# Patient Record
Sex: MALE | Race: WHITE | Employment: UNEMPLOYED | URBAN - METROPOLITAN AREA
[De-identification: names, ages, dates, MRNs, and addresses within clinical notes are randomized per-mention and may not be internally consistent; named-entity substitution may affect disease eponyms.]

---

## 2019-10-04 ENCOUNTER — HOSPITAL ENCOUNTER (INPATIENT)
Facility: CLINIC | Age: 44
LOS: 3 days | Discharge: LEFT BEFORE DISCHARGE | End: 2019-10-07
Attending: PSYCHIATRY & NEUROLOGY | Admitting: PSYCHIATRY & NEUROLOGY
Payer: MEDICAID

## 2019-10-04 DIAGNOSIS — F19.20 CHEMICAL DEPENDENCY (H): ICD-10-CM

## 2019-10-04 DIAGNOSIS — F11.93 OPIATE WITHDRAWAL (H): ICD-10-CM

## 2019-10-04 DIAGNOSIS — F11.20 UNCOMPLICATED OPIOID DEPENDENCE (H): ICD-10-CM

## 2019-10-04 PROBLEM — F19.10 POLYSUBSTANCE ABUSE (H): Status: ACTIVE | Noted: 2019-10-04

## 2019-10-04 LAB
AMPHETAMINES UR QL SCN: POSITIVE
ANION GAP SERPL CALCULATED.3IONS-SCNC: 6 MMOL/L (ref 3–14)
BARBITURATES UR QL: NEGATIVE
BASOPHILS # BLD AUTO: 0 10E9/L (ref 0–0.2)
BASOPHILS NFR BLD AUTO: 0.1 %
BENZODIAZ UR QL: POSITIVE
BUN SERPL-MCNC: 11 MG/DL (ref 7–30)
CALCIUM SERPL-MCNC: 8.2 MG/DL (ref 8.5–10.1)
CANNABINOIDS UR QL SCN: NEGATIVE
CHLORIDE SERPL-SCNC: 101 MMOL/L (ref 94–109)
CO2 SERPL-SCNC: 29 MMOL/L (ref 20–32)
COCAINE UR QL: NEGATIVE
CREAT SERPL-MCNC: 0.86 MG/DL (ref 0.66–1.25)
DIFFERENTIAL METHOD BLD: ABNORMAL
EOSINOPHIL # BLD AUTO: 0.4 10E9/L (ref 0–0.7)
EOSINOPHIL NFR BLD AUTO: 6.1 %
ERYTHROCYTE [DISTWIDTH] IN BLOOD BY AUTOMATED COUNT: 13.2 % (ref 10–15)
ETHANOL UR QL SCN: NEGATIVE
GFR SERPL CREATININE-BSD FRML MDRD: >90 ML/MIN/{1.73_M2}
GLUCOSE SERPL-MCNC: 108 MG/DL (ref 70–99)
HCT VFR BLD AUTO: 37.6 % (ref 40–53)
HGB BLD-MCNC: 12 G/DL (ref 13.3–17.7)
IMM GRANULOCYTES # BLD: 0 10E9/L (ref 0–0.4)
IMM GRANULOCYTES NFR BLD: 0.3 %
LYMPHOCYTES # BLD AUTO: 1.4 10E9/L (ref 0.8–5.3)
LYMPHOCYTES NFR BLD AUTO: 20 %
MCH RBC QN AUTO: 26.8 PG (ref 26.5–33)
MCHC RBC AUTO-ENTMCNC: 31.9 G/DL (ref 31.5–36.5)
MCV RBC AUTO: 84 FL (ref 78–100)
MONOCYTES # BLD AUTO: 0.6 10E9/L (ref 0–1.3)
MONOCYTES NFR BLD AUTO: 8.7 %
NEUTROPHILS # BLD AUTO: 4.5 10E9/L (ref 1.6–8.3)
NEUTROPHILS NFR BLD AUTO: 64.8 %
NRBC # BLD AUTO: 0 10*3/UL
NRBC BLD AUTO-RTO: 0 /100
OPIATES UR QL SCN: POSITIVE
PLATELET # BLD AUTO: 285 10E9/L (ref 150–450)
POTASSIUM SERPL-SCNC: 3.8 MMOL/L (ref 3.4–5.3)
RBC # BLD AUTO: 4.47 10E12/L (ref 4.4–5.9)
SODIUM SERPL-SCNC: 138 MMOL/L (ref 133–144)
WBC # BLD AUTO: 6.9 10E9/L (ref 4–11)

## 2019-10-04 PROCEDURE — 80048 BASIC METABOLIC PNL TOTAL CA: CPT | Performed by: PSYCHIATRY & NEUROLOGY

## 2019-10-04 PROCEDURE — 80307 DRUG TEST PRSMV CHEM ANLYZR: CPT | Performed by: FAMILY MEDICINE

## 2019-10-04 PROCEDURE — HZ2ZZZZ DETOXIFICATION SERVICES FOR SUBSTANCE ABUSE TREATMENT: ICD-10-PCS | Performed by: PSYCHIATRY & NEUROLOGY

## 2019-10-04 PROCEDURE — 12800008 ZZH R&B CD ADULT

## 2019-10-04 PROCEDURE — 99285 EMERGENCY DEPT VISIT HI MDM: CPT | Mod: Z6 | Performed by: PSYCHIATRY & NEUROLOGY

## 2019-10-04 PROCEDURE — 99285 EMERGENCY DEPT VISIT HI MDM: CPT | Performed by: PSYCHIATRY & NEUROLOGY

## 2019-10-04 PROCEDURE — 25000132 ZZH RX MED GY IP 250 OP 250 PS 637: Performed by: PSYCHIATRY & NEUROLOGY

## 2019-10-04 PROCEDURE — 85025 COMPLETE CBC W/AUTO DIFF WBC: CPT | Performed by: PSYCHIATRY & NEUROLOGY

## 2019-10-04 PROCEDURE — 80320 DRUG SCREEN QUANTALCOHOLS: CPT | Performed by: FAMILY MEDICINE

## 2019-10-04 RX ORDER — ONDANSETRON 4 MG/1
4 TABLET, ORALLY DISINTEGRATING ORAL EVERY 6 HOURS PRN
Status: DISCONTINUED | OUTPATIENT
Start: 2019-10-04 | End: 2019-10-07 | Stop reason: HOSPADM

## 2019-10-04 RX ORDER — LOPERAMIDE HCL 2 MG
2 CAPSULE ORAL 4 TIMES DAILY PRN
Status: DISCONTINUED | OUTPATIENT
Start: 2019-10-04 | End: 2019-10-07 | Stop reason: HOSPADM

## 2019-10-04 RX ORDER — BISACODYL 10 MG
10 SUPPOSITORY, RECTAL RECTAL DAILY PRN
Status: DISCONTINUED | OUTPATIENT
Start: 2019-10-04 | End: 2019-10-07 | Stop reason: HOSPADM

## 2019-10-04 RX ORDER — ACETAMINOPHEN 325 MG/1
650 TABLET ORAL EVERY 4 HOURS PRN
Status: DISCONTINUED | OUTPATIENT
Start: 2019-10-04 | End: 2019-10-07 | Stop reason: HOSPADM

## 2019-10-04 RX ORDER — TRAZODONE HYDROCHLORIDE 50 MG/1
50 TABLET, FILM COATED ORAL
Status: DISCONTINUED | OUTPATIENT
Start: 2019-10-04 | End: 2019-10-07 | Stop reason: HOSPADM

## 2019-10-04 RX ORDER — HYDROXYZINE HYDROCHLORIDE 25 MG/1
25 TABLET, FILM COATED ORAL EVERY 4 HOURS PRN
Status: DISCONTINUED | OUTPATIENT
Start: 2019-10-04 | End: 2019-10-07 | Stop reason: HOSPADM

## 2019-10-04 RX ORDER — IBUPROFEN 600 MG/1
600 TABLET, FILM COATED ORAL EVERY 6 HOURS PRN
Status: DISCONTINUED | OUTPATIENT
Start: 2019-10-04 | End: 2019-10-07 | Stop reason: HOSPADM

## 2019-10-04 RX ORDER — BUPRENORPHINE 2 MG/1
4 TABLET SUBLINGUAL 2 TIMES DAILY
Status: DISCONTINUED | OUTPATIENT
Start: 2019-10-05 | End: 2019-10-05

## 2019-10-04 RX ORDER — ALUMINA, MAGNESIA, AND SIMETHICONE 2400; 2400; 240 MG/30ML; MG/30ML; MG/30ML
30 SUSPENSION ORAL EVERY 4 HOURS PRN
Status: DISCONTINUED | OUTPATIENT
Start: 2019-10-04 | End: 2019-10-07 | Stop reason: HOSPADM

## 2019-10-04 RX ORDER — BUPRENORPHINE 2 MG/1
4 TABLET SUBLINGUAL ONCE
Status: COMPLETED | OUTPATIENT
Start: 2019-10-04 | End: 2019-10-04

## 2019-10-04 RX ADMIN — BUPRENORPHINE HCL 4 MG: 2 TABLET SUBLINGUAL at 22:54

## 2019-10-04 SDOH — HEALTH STABILITY: MENTAL HEALTH: HOW OFTEN DO YOU HAVE A DRINK CONTAINING ALCOHOL?: NEVER

## 2019-10-04 ASSESSMENT — ENCOUNTER SYMPTOMS
DIAPHORESIS: 1
RESPIRATORY NEGATIVE: 1
ACTIVITY CHANGE: 1
NEUROLOGICAL NEGATIVE: 1
CARDIOVASCULAR NEGATIVE: 1
HALLUCINATIONS: 0
HYPERACTIVE: 0
EYES NEGATIVE: 1
CHILLS: 1
PSYCHIATRIC NEGATIVE: 1
AGITATION: 0
GASTROINTESTINAL NEGATIVE: 1
MUSCULOSKELETAL NEGATIVE: 1

## 2019-10-04 ASSESSMENT — ACTIVITIES OF DAILY LIVING (ADL): HYGIENE/GROOMING: INDEPENDENT

## 2019-10-04 ASSESSMENT — MIFFLIN-ST. JEOR: SCORE: 1715.92

## 2019-10-04 NOTE — PHARMACY-ADMISSION MEDICATION HISTORY
Admission medication history for the October 4, 2019 admission is complete.     Medication history interview sources: patient    Medication compliance: N/A - patient not prescribed medications    Changes made to PTA medication list (reason)  Added: none  Deleted: none  Changed: none    Additional medication history information (including reliability of information, actions taken by pharmacist):  - Patient denies taking/being prescribed prescription medications and over-the-counter (OTC) products such as vitamins, sleep aids, etc.      Prior to Admission medications    Not on File       Time spent: 5 minutes    Medication history completed by:   Joaquim Rosas, Pharm.D.  [facility]  Emergency Department: Ascom *[phone]

## 2019-10-04 NOTE — ED PROVIDER NOTES
History     Chief Complaint   Patient presents with     Drug / Alcohol Assessment     Seeing dtox from heroin snorts 1/2gm -1 gm per day.  Last used 0200 today.  Smokes meth 1/2 gm  per day  Last used @ 05-06 this morning.     The history is provided by the patient.   Drug / Alcohol Assessment       Wagner Lujan is a 43 year old male who is here seeking detox for heroin addiction. Patient has not been in detox for opiate dependence. He has history of being in treatment for alcohol over 20 years ago. Patient reports abusing heroin past 2 years. He had tried pain pills previously but admits to abusing meth and started on heroin to prolong the effect. He snorts up to 1 gm heroin daily. He smokes meth frequently. He last used heroin at 2 am this morning and used meth at 6 am this morning. He has sweats and chills as withdrawal presently. He recently got into trouble with the law. He is tired of the addiction cycle and the expensive habit. He has tried to get into treatment and has a rule 25 in 3 days. He came here seeking detox as he is unable to stop on his own. Patient denies using other drugs. He is also positive for benzos in his urine. He is not on any meds. And reports being generally healthy. He denies history of IVDU. He denies feeling suicidal nor have hallucinations or paranoia.     PERSONAL MEDICAL HISTORY  Past Medical History:   Diagnosis Date     Substance abuse (H) 2004    [facility]     PAST SURGICAL HISTORY  History reviewed. No pertinent surgical history.  FAMILY HISTORY  History reviewed. No pertinent family history.  SOCIAL HISTORY  Social History     Tobacco Use     Smoking status: Never Smoker     Smokeless tobacco: Never Used   Substance Use Topics     Alcohol use: Never     Frequency: Never     MEDICATIONS  No current facility-administered medications for this encounter.      No current outpatient medications on file.     ALLERGIES  No Known Allergies      I have reviewed the  Medications, Allergies, Past Medical and Surgical History, and Social History in the Epic system.    Review of Systems   Constitutional: Positive for activity change, chills and diaphoresis.   HENT: Negative.    Eyes: Negative.    Respiratory: Negative.    Cardiovascular: Negative.    Gastrointestinal: Negative.    Genitourinary: Negative.    Musculoskeletal: Negative.    Skin: Negative.    Neurological: Negative.    Psychiatric/Behavioral: Negative.  Negative for agitation, behavioral problems, hallucinations and suicidal ideas. The patient is not hyperactive.    All other systems reviewed and are negative.      Physical Exam   BP: 110/71  Pulse: 78  Temp: 98  F (36.7  C)  Resp: 16  Weight: 77.1 kg (170 lb)  SpO2: 100 %      Physical Exam  Vitals signs and nursing note reviewed.   Constitutional:       Appearance: Normal appearance.   HENT:      Head: Normocephalic and atraumatic.      Nose: Nose normal.      Mouth/Throat:      Mouth: Mucous membranes are moist.   Eyes:      Pupils: Pupils are equal, round, and reactive to light.   Neck:      Musculoskeletal: Normal range of motion.   Cardiovascular:      Rate and Rhythm: Normal rate.   Pulmonary:      Effort: Pulmonary effort is normal.      Breath sounds: Normal breath sounds.   Abdominal:      General: Abdomen is flat.   Musculoskeletal: Normal range of motion.   Skin:     General: Skin is warm.   Neurological:      General: No focal deficit present.      Mental Status: He is alert and oriented to person, place, and time.   Psychiatric:         Attention and Perception: Attention and perception normal.         Mood and Affect: Mood and affect normal.         Speech: Speech normal.         Behavior: Behavior normal. Behavior is not agitated, aggressive, hyperactive or combative. Behavior is cooperative.         Thought Content: Thought content normal. Thought content is not paranoid or delusional. Thought content does not include homicidal or suicidal ideation.          Cognition and Memory: Cognition normal.         Judgement: Judgment normal.         ED Course        Procedures               Labs Ordered and Resulted from Time of ED Arrival Up to the Time of Departure from the ED   DRUG ABUSE SCREEN 6 CHEM DEP URINE ([facility]) - Abnormal; Notable for the following components:       Result Value    Amphetamine Qual Urine Positive (*)     Benzodiazepine Qual Urine Positive (*)     Opiates Qualitative Urine Positive (*)     All other components within normal limits   CBC WITH PLATELETS DIFFERENTIAL   BASIC METABOLIC PANEL            Assessments & Plan (with Medical Decision Making)   Patient with opiate dependence who is in early withdrawal. He would like help with detox. He has a bed held. He is medically and psychiatrically cleared for admission.    I have reviewed the nursing notes.    I have reviewed the findings, diagnosis, plan and need for follow up with the patient.    New Prescriptions    No medications on file       Final diagnoses:   Opiate withdrawal (H)   Chemical dependency (H)   Uncomplicated opioid dependence (H)       10/4/2019   [facility], [place], EMERGENCY DEPARTMENT     Eyad Ocasio MD  10/04/19 6094

## 2019-10-04 NOTE — ED NOTES
ED to Behavioral Floor Handoff    SITUATION  Wagner Lujan is a 43 year old male who speaks English and lives in a home with others The patient arrived in the ED by private car from home with a complaint of Drug / Alcohol Assessment (Seeing dtox from heroin snorts 1/2gm -1 gm per day.  Last used 0200 today.  Smokes meth 1/2 gm  per day  Last used @ 05-06 this morning.)  .The patient's current symptoms started/worsened 6 month(s) ago and during this time the symptoms have increased.   In the ED, pt was diagnosed with   Final diagnoses:   Opiate withdrawal (H)   Chemical dependency (H)   Uncomplicated opioid dependence (H)        Initial vitals were: BP: 110/71  Pulse: 78  Temp: 98  F (36.7  C)  Resp: 16  Weight: 77.1 kg (170 lb)  SpO2: 100 %   --------  Is the patient diabetic? No   If yes, last blood glucose? --     If yes, was this treated in the ED? --  --------  Is the patient inebriated (ETOH) No or Impaired on other substances? Yes  MSSA done? N/A  Last MSSA score: --    Were withdrawal symptoms treated? No  Does the patient have a seizure history? No. If yes, date of most recent seizure--  --------  Is the patient patient experiencing suicidal ideation? denies current or recent suicidal ideation     Homicidal ideation? denies current or recent homicidal ideation or behaviors.    Self-injurious behavior/urges? denies current or recent self injurious behavior or ideation.  ------  Was pt aggressive in the ED No  Was a code called No  Is the pt now cooperative? Yes  -------  Meds given in ED: Medications - No data to display   Family present during ED course? No  Family currently present? No    BACKGROUND  Does the patient have a cognitive impairment or developmental disability? No  Allergies: No Known Allergies.   Social demographics are   Social History     Socioeconomic History     Marital status: Single     Spouse name: None     Number of children: None     Years of education: None     Highest education  level: None   Occupational History     None   Social Needs     Financial resource strain: None     Food insecurity:     Worry: None     Inability: None     Transportation needs:     Medical: None     Non-medical: None   Tobacco Use     Smoking status: Never Smoker     Smokeless tobacco: Never Used   Substance and Sexual Activity     Alcohol use: Never     Frequency: Never     Drug use: Yes     Types: Methamphetamines, Opiates     Sexual activity: None   Lifestyle     Physical activity:     Days per week: None     Minutes per session: None     Stress: None   Relationships     Social connections:     Talks on phone: None     Gets together: None     Attends Restoration service: None     Active member of club or organization: None     Attends meetings of clubs or organizations: None     Relationship status: None     Intimate partner violence:     Fear of current or ex partner: None     Emotionally abused: None     Physically abused: None     Forced sexual activity: None   Other Topics Concern     None   Social History Narrative     None        ASSESSMENT  Labs results   Labs Ordered and Resulted from Time of ED Arrival Up to the Time of Departure from the ED   DRUG ABUSE SCREEN 6 CHEM DEP URINE ([facility]) - Abnormal; Notable for the following components:       Result Value    Amphetamine Qual Urine Positive (*)     Benzodiazepine Qual Urine Positive (*)     Opiates Qualitative Urine Positive (*)     All other components within normal limits   CBC WITH PLATELETS DIFFERENTIAL - Abnormal; Notable for the following components:    Hemoglobin 12.0 (*)     Hematocrit 37.6 (*)     All other components within normal limits   BASIC METABOLIC PANEL - Abnormal; Notable for the following components:    Glucose 108 (*)     Calcium 8.2 (*)     All other components within normal limits      Imaging Studies: No results found for this or any previous visit (from the past 24 hour(s)).   Most recent vital signs /71   Pulse 78   Temp 98   F (36.7  C) (Oral)   Resp 16   Wt 77.1 kg (170 lb)   SpO2 100%    Abnormal labs/tests/findings requiring intervention:---   Pain control: pt had none  Nausea control: pt had none    RECOMMENDATION  Are any infection precautions needed (MRSA, VRE, etc.)? No If yes, what infection? --  ---  Does the patient have mobility issues? no If yes, what device does the pt use? ---  ---  Is patient on 72 hour hold or commitment? no If on 72 hour hold, have hold and rights been given to patient? N/A  Are admitting orders written if after 10 p.m. ? no  Tasks needing to be completed:---     Fran Ford RN   [facility]-- *[phone] [facility]

## 2019-10-05 LAB
CHOLEST SERPL-MCNC: 163 MG/DL
HDLC SERPL-MCNC: 88 MG/DL
LDLC SERPL CALC-MCNC: 70 MG/DL
NONHDLC SERPL-MCNC: 75 MG/DL
T4 FREE SERPL-MCNC: 1.01 NG/DL (ref 0.76–1.46)
TRIGL SERPL-MCNC: 23 MG/DL
TSH SERPL DL<=0.005 MIU/L-ACNC: 0.22 MU/L (ref 0.4–4)

## 2019-10-05 PROCEDURE — 25000131 ZZH RX MED GY IP 250 OP 636 PS 637: Performed by: PSYCHIATRY & NEUROLOGY

## 2019-10-05 PROCEDURE — 25000132 ZZH RX MED GY IP 250 OP 250 PS 637: Performed by: PSYCHIATRY & NEUROLOGY

## 2019-10-05 PROCEDURE — H2035 A/D TX PROGRAM, PER HOUR: HCPCS | Mod: HQ

## 2019-10-05 PROCEDURE — 84443 ASSAY THYROID STIM HORMONE: CPT | Performed by: PSYCHIATRY & NEUROLOGY

## 2019-10-05 PROCEDURE — 99207 ZZC DOWN CODE DUE TO SUBSEQUENT EXAM: CPT | Performed by: PSYCHIATRY & NEUROLOGY

## 2019-10-05 PROCEDURE — 80061 LIPID PANEL: CPT | Performed by: PSYCHIATRY & NEUROLOGY

## 2019-10-05 PROCEDURE — 84439 ASSAY OF FREE THYROXINE: CPT | Performed by: PSYCHIATRY & NEUROLOGY

## 2019-10-05 PROCEDURE — 99207 ZZC NON-BILLABLE SERV PER CHARTING: CPT | Performed by: PHYSICIAN ASSISTANT

## 2019-10-05 PROCEDURE — 99207 ZZC CDG-HISTORY COMP: MEETS EXP. PROBLEM FOCUSED-DOWN CODED LACK OF ROS: CPT | Performed by: PSYCHIATRY & NEUROLOGY

## 2019-10-05 PROCEDURE — 36415 COLL VENOUS BLD VENIPUNCTURE: CPT | Performed by: PSYCHIATRY & NEUROLOGY

## 2019-10-05 PROCEDURE — 99221 1ST HOSP IP/OBS SF/LOW 40: CPT | Mod: AI | Performed by: PSYCHIATRY & NEUROLOGY

## 2019-10-05 PROCEDURE — 12800008 ZZH R&B CD ADULT

## 2019-10-05 RX ORDER — BUPRENORPHINE 2 MG/1
4 TABLET SUBLINGUAL ONCE
Status: COMPLETED | OUTPATIENT
Start: 2019-10-05 | End: 2019-10-05

## 2019-10-05 RX ORDER — BUPRENORPHINE AND NALOXONE 8; 2 MG/1; MG/1
1 FILM, SOLUBLE BUCCAL; SUBLINGUAL DAILY
Status: DISCONTINUED | OUTPATIENT
Start: 2019-10-05 | End: 2019-10-07 | Stop reason: HOSPADM

## 2019-10-05 RX ADMIN — ACETAMINOPHEN 650 MG: 325 TABLET, FILM COATED ORAL at 20:33

## 2019-10-05 RX ADMIN — HYDROXYZINE HYDROCHLORIDE 25 MG: 25 TABLET, FILM COATED ORAL at 20:33

## 2019-10-05 RX ADMIN — IBUPROFEN 600 MG: 600 TABLET ORAL at 09:38

## 2019-10-05 RX ADMIN — HYDROXYZINE HYDROCHLORIDE 25 MG: 25 TABLET, FILM COATED ORAL at 16:22

## 2019-10-05 RX ADMIN — IBUPROFEN 600 MG: 600 TABLET ORAL at 16:22

## 2019-10-05 RX ADMIN — BUPRENORPHINE AND NALOXONE 1 FILM: 8; 2 FILM, SOLUBLE BUCCAL; SUBLINGUAL at 11:03

## 2019-10-05 RX ADMIN — ONDANSETRON 4 MG: 4 TABLET, ORALLY DISINTEGRATING ORAL at 16:22

## 2019-10-05 RX ADMIN — BUPRENORPHINE HCL 4 MG: 2 TABLET SUBLINGUAL at 09:38

## 2019-10-05 RX ADMIN — ONDANSETRON 4 MG: 4 TABLET, ORALLY DISINTEGRATING ORAL at 09:38

## 2019-10-05 RX ADMIN — TRAZODONE HYDROCHLORIDE 50 MG: 50 TABLET ORAL at 21:21

## 2019-10-05 ASSESSMENT — ACTIVITIES OF DAILY LIVING (ADL)
HYGIENE/GROOMING: INDEPENDENT
BATHING: 0-->INDEPENDENT
COGNITION: 0 - NO COGNITION ISSUES REPORTED
RETIRED_COMMUNICATION: 0-->UNDERSTANDS/COMMUNICATES WITHOUT DIFFICULTY
PRIOR_FUNCTIONAL_LEVEL_COMMENT: IND
AMBULATION: 0-->INDEPENDENT
FALL_HISTORY_WITHIN_LAST_SIX_MONTHS: NO
HYGIENE/GROOMING: INDEPENDENT
ORAL_HYGIENE: INDEPENDENT
SWALLOWING: 0-->SWALLOWS FOODS/LIQUIDS WITHOUT DIFFICULTY
DRESS: 0-->INDEPENDENT
DRESS: INDEPENDENT
TRANSFERRING: 0-->INDEPENDENT
TOILETING: 0-->INDEPENDENT
RETIRED_EATING: 0-->INDEPENDENT

## 2019-10-05 NOTE — PLAN OF CARE
Problem: Substance Withdrawal  Goal: Substance Withdrawal  Description  Signs and symptoms of listed problems will be absent or manageable.  Outcome: Improving     Pt admitted for opiate withdrawal, also using Meth.  COW's 14, given zofran, Ibuprofen and subutex 4mg, then suboxone 8mg/2mg for a total of 12 mg on this shift.  He reports sx improved, he appears more comfortable this afternoon.  He denies depression, endorses situational anxiety, poor sleep, had nausea but resolved with Zofran.  Appetite is poor, but he is drinking fluids well and reports no current issues or concerns. He was instructed to complete his CD assessment paperwork. Will continue to monitor and assist with discharge planning.

## 2019-10-05 NOTE — PROGRESS NOTES
10/05/19 7981   Group Therapy Session   Group Attendance attended group session   Total Time (minutes) 45   Group Type psychotherapeutic   Group Topic Covered other (see comments)   Patient Participation/Contribution cooperative with task;discussed personal experience with topic   Psychotherapy group goals: Identify and share responses to 4 questions (fears, loves/likes, things to let go, wants).    Sammy presented as lethargic and quiet, affect flat. He participated in the activity and processed his responses with the group.

## 2019-10-05 NOTE — H&P
IDENTIFYING INFORMATION:  The patient is a 43-year-old single  male.  He works for [employer].      CHIEF COMPLAINT:  Heroin.      HISTORY OF PRESENT ILLNESS:  The patient had a previous history of alcoholism, but 4 years ago started experimenting with pain pills and 2 years ago switched from pain pills to heroin.  He has tolerance, withdrawal, progressive use with loss of control, used despite negative consequences, job, money.  He snorts it, does not use it IV.  He is sober from alcohol, but previously had alcohol issues and was in treatment.  He was using meth, started at 21, he smokes it.  He has  tolerance, withdrawal, progressive use with loss of control.  His U-tox was positive for benzos, but denies using it.  He denies any mental health issues.  Denies any suicidal or homicidal ideation, plan or intent.  Denies any auditory or visual hallucinations.      PAST PSYCHIATRIC HISTORY:  He was never psychiatrically hospitalized, but he was in treatment at [facility].      REVIEW OF SYSTEMS:  A 10-point review systems was reviewed and is significant for having opiate withdrawal symptoms.  The patient is having a runny nose, chills, body aches.      VITAL SIGNS:  Temperature of 98, pulse of 83, respiratory rate of 16, blood pressure 128/78.      FAMILY HISTORY:  Maternal grandmother and mother have alcoholism.  No mental health issues.      SOCIAL HISTORY:  Born in [place] and grew up in [place], no abuse, 12 plus 4 years of education.      MENTAL STATUS EXAMINATION:  The patient is a 43-year-old  male who appears his stated age.  He has adequate grooming, adequate hygiene, maintains good eye contact, cooperative, no psychomotor abnormalities.  Normal gait.  Mood is anxious.  Affect is congruent.  Speech is spontaneous, normal rate.  Linear thought process with no loose associations.  Does not have any active suicidal or homicidal ideation.  Does not have auditory hallucinations.   Judgment and insight are fair.  Alert, oriented x3.  Recent memory, language are all adequate.        DIAGNOSES:    1.  Opiate use disorder, severe.   2.  Opiate withdrawal.      PLAN:  The patient wants to be on Suboxone maintenance.  The patient wants inpatient treatment.  The patient's U-tox was positive for benzos, but he denies using any benzos.  He will be having symptomatic detox from methamphetamine.  The patient will be followed by Dr. Hammond who resumes care on Monday.         ASH DELEON MD             D: 10/05/2019   T: 10/05/2019   MT: DANNY      Name:     NATALIE WILSON   MRN:      [id number]        Account:      [id number]   :      1975        Admitted:     10/04/2019                   Document: [id number]       cc: Cleve Barragan MD

## 2019-10-05 NOTE — CONSULTS
"  [facility]  Internal Medicine Consult     Wagner Lujan MRN# [id number]   Age: 43 year old YOB: 1975     Date of Admission: 10/4/2019  Date of Consult:  10/5/2019    Primary Care Provider: Cleve Barragan    Requesting Service: Psychiatry  Reason for Consult: General Medical Evaluation         Assessment and Recommendations:   Wagner Lujan is a 43 year old male with a hx of polysubstance abuse who was admitted to [facility] seeking detox from heroin.     # Polysubstance abuse (methamphetamine, heroin, alcohol), acute opiate withdrawal. Management per primary team, psychiatry. Lab workup unremarkable. VSS.   - Agree with subutex        Internal Medicine will sign off at this time. Please notify if any intercurrent medical questions or concerns arise. Thank you for involving me in this patients care.       Smitha Ireland PA-C  Hospitalist Service  [phone]         Chief Complaint:   \"Shakes\"         History of Present Illness:   Wagner Lujan is a 43 year old male with a hx of polysubstance abuse who was admitted to [facility] seeking detox from heroin.   Per ED note:  \"Patient has not been in detox for opiate dependence. He has history of being in treatment for alcohol over 20 years ago. Patient reports abusing heroin past 2 years. He had tried pain pills previously but admits to abusing meth and started on heroin to prolong the effect. He snorts up to 1 gm heroin daily. He smokes meth frequently. He last used heroin at 2 am this morning and used meth at 6 am this morning. He has sweats and chills as withdrawal presently. He recently got into trouble with the law. He is tired of the addiction cycle and the expensive habit. He has tried to get into treatment and has a rule 25 in 3 days. He came here seeking detox as he is unable to stop on his own. Patient denies using other drugs. He is also positive for benzos in his urine. He is not on any meds. And " "reports being generally healthy. He denies history of IVDU. He denies feeling suicidal nor have hallucinations or paranoia. \"  Currently he complains of shakes and body aches. States that he experienced some intermittent chest pain and shortness of breath yesterday, but this resolved without intervention and he is currently asymptomatic. No fevers, chills or abdominal pain. Denies IVDU.          Review of Systems:   A 10 point review of systems was performed and is negative unless otherwise noted in HPI.          Past Medical History:     Past Medical History:   Diagnosis Date     Substance abuse (H) 2004    [facility]            Past Surgical History:    History reviewed. No pertinent surgical history.          Family History:   History reviewed. No pertinent family history.          Social History:     Social History     Tobacco Use     Smoking status: Never Smoker     Smokeless tobacco: Never Used   Substance Use Topics     Alcohol use: Never     Frequency: Never             Medications:     Current Facility-Administered Medications   Medication     acetaminophen (TYLENOL) tablet 650 mg     alum & mag hydroxide-simethicone (MYLANTA ES/MAALOX  ES) suspension 30 mL     bisacodyl (DULCOLAX) Suppository 10 mg     buprenorphine (SUBUTEX) sublingual tablet 4 mg     hydrOXYzine (ATARAX) tablet 25 mg     ibuprofen (ADVIL/MOTRIN) tablet 600 mg     loperamide (IMODIUM) capsule 2 mg     magnesium hydroxide (MILK OF MAGNESIA) suspension 30 mL     ondansetron (ZOFRAN-ODT) ODT tab 4 mg     traZODone (DESYREL) tablet 50 mg            Allergies:   No Known Allergies          Physical Exam:   /78 (BP Location: Left arm)   Pulse 83   Temp 98  F (36.7  C) (Oral)   Resp 16   Ht 1.803 m (5' 11\")   Wt 79.4 kg (175 lb)   SpO2 95%   BMI 24.41 kg/m     GENERAL: Alert and oriented x 3. NAD.   HEENT: Anicteric sclera. Mucous membranes moist.   CV: RRR. S1, S2. No murmurs appreciated.   RESPIRATORY: Effort normal " on room air. Coarse lung sounds in bilateral bases with faint expiratory wheeze.   GI: Abdomen soft and non distended with normoactive bowel sounds present in all quadrants. No tenderness, rebound, guarding.   MUSCULOSKELETAL: No joint swelling or tenderness. Moves all extremities.   NEUROLOGICAL: No focal deficits. Strength 5/5 bilaterally in upper and lower extremities.   EXTREMITIES: No peripheral edema. Intact bilateral pedal pulses.   SKIN: No jaundice. No rashes.          Labs:   CBC:  Recent Labs   Lab Test 10/04/19  1617   WBC 6.9   RBC 4.47   HGB 12.0*   HCT 37.6*   MCV 84   MCH 26.8   MCHC 31.9   RDW 13.2          CMP:  Recent Labs   Lab Test 10/04/19  1617      POTASSIUM 3.8   CHLORIDE 101   TORO 8.2*   CO2 29   BUN 11   CR 0.86   *             Smitha Ireland PA-C  Internal Medicine [facility] Hospitalist   [facility]   Pager: [phone]

## 2019-10-05 NOTE — PROGRESS NOTES
This is a 44 yo male admitted voluntary to 3[facility] due to polysubstance abuse  The pt uses meth 1/2 gram every day x 20 years.  Smokes.   GABRIELE was last noc at 2300  He uses heroin 12 to 1 gram every day x 2 years  Snorts or smokes.  GABRIELE was last noc at 2300  The pt denies alcohol use or other drug use.  Denies IV use  The pt has never been in detox has been to one treatment 15 years ago for alcohol and that was as [facility]  He has been free of alcohol since  The pts stressors are financial in nature and he has legal problems  Currently he is unemployed and has a 13 yo daughter who is with her mother  Cooperative with the interview

## 2019-10-05 NOTE — PROGRESS NOTES
Writer met with patient today. He indicated that he is going through a very bad withdrawal from his past heroin use. Patient states that he needs to have a 3 days detox in order to get to treatment and has chemical evaluation set up with a counselor in [facility] early next week. Patient verbalizes desire to go to treatment and reports that he has a place to go to after his detox. Patient expresses gratitude to be here and get the help that needs in order to get better.

## 2019-10-05 NOTE — PROGRESS NOTES
10/04/19 2101   Patient Belongings   Did you bring any home meds/supplements to the hospital?  No   Patient Belongings other (see comments)  (storage bin, discharge bin)   Patient Belongings Put in Hospital Secure Location (Security or Locker, etc.) other (see comments)   Belongings Search Yes   Clothing Search Yes   Second Staff ANGE Landon   storage bin: jacket, boots, belt, cap  Discharge bin: 2 cell phones  A               Admission:  I am responsible for any personal items that are not sent to the safe or pharmacy.  [facility] is not responsible for loss, theft or damage of any property in my possession.    Signature:  _________________________________ Date: _______  Time: _____                                              Staff Signature:  ____________________________ Date: ________  Time: _____      2nd Staff person, if patient is unable/unwilling to sign:    Signature: ________________________________ Date: ________  Time: _____     Discharge:  [place] has returned all of my personal belongings:    Signature: _________________________________ Date: ________  Time: _____                                          Staff Signature:  ____________________________ Date: ________  Time: _____

## 2019-10-06 PROCEDURE — 12800008 ZZH R&B CD ADULT

## 2019-10-06 PROCEDURE — 25000132 ZZH RX MED GY IP 250 OP 250 PS 637: Performed by: PSYCHIATRY & NEUROLOGY

## 2019-10-06 RX ORDER — QUETIAPINE FUMARATE 25 MG/1
25-50 TABLET, FILM COATED ORAL
Status: DISCONTINUED | OUTPATIENT
Start: 2019-10-06 | End: 2019-10-07 | Stop reason: HOSPADM

## 2019-10-06 RX ADMIN — QUETIAPINE FUMARATE 50 MG: 25 TABLET ORAL at 20:59

## 2019-10-06 RX ADMIN — IBUPROFEN 600 MG: 600 TABLET ORAL at 08:41

## 2019-10-06 RX ADMIN — HYDROXYZINE HYDROCHLORIDE 25 MG: 25 TABLET, FILM COATED ORAL at 11:08

## 2019-10-06 RX ADMIN — HYDROXYZINE HYDROCHLORIDE 25 MG: 25 TABLET, FILM COATED ORAL at 02:42

## 2019-10-06 RX ADMIN — HYDROXYZINE HYDROCHLORIDE 25 MG: 25 TABLET, FILM COATED ORAL at 16:29

## 2019-10-06 RX ADMIN — ACETAMINOPHEN 650 MG: 325 TABLET, FILM COATED ORAL at 11:08

## 2019-10-06 RX ADMIN — TRAZODONE HYDROCHLORIDE 50 MG: 50 TABLET ORAL at 02:42

## 2019-10-06 RX ADMIN — BUPRENORPHINE AND NALOXONE 1 FILM: 8; 2 FILM, SOLUBLE BUCCAL; SUBLINGUAL at 08:41

## 2019-10-06 RX ADMIN — HYDROXYZINE HYDROCHLORIDE 25 MG: 25 TABLET, FILM COATED ORAL at 20:11

## 2019-10-06 ASSESSMENT — ACTIVITIES OF DAILY LIVING (ADL)
HYGIENE/GROOMING: INDEPENDENT;HANDWASHING
ORAL_HYGIENE: INDEPENDENT
DRESS: SCRUBS (BEHAVIORAL HEALTH)
HYGIENE/GROOMING: INDEPENDENT

## 2019-10-06 NOTE — PLAN OF CARE
Problem: Substance Withdrawal  Goal: Substance Withdrawal  Description  Signs and symptoms of listed problems will be absent or manageable.  Outcome: Improving     Pt admitted for opiate withdrawal, also using Meth.  COW's 6, given Ibuprofen for leg cramping, he is on suboxone 8mg/2mg with plan to resume suboxone maintnenance  at [facility] with a Dr. Elizondo.  He reports that he has the phone number and will call tomorrow to schedule an appointment ASAP.  He reports sx improved, but very restless sleep last night despite Trazodone 50mg X 2 and Vistaril.  He denies depression, reports ongoing situational anxiety, appetite is good and denies any other needs or concerns.  He would like a Rule 25 done and submitted to [facility] and was encouraged to complete CD assessment paperwork and turn it in ASAP.   Will continue to monitor and assist with discharge planning. Will discuss sleep with Dr. Ocampo.

## 2019-10-07 VITALS
DIASTOLIC BLOOD PRESSURE: 64 MMHG | TEMPERATURE: 98.8 F | HEIGHT: 71 IN | WEIGHT: 175 LBS | BODY MASS INDEX: 24.5 KG/M2 | RESPIRATION RATE: 16 BRPM | OXYGEN SATURATION: 100 % | SYSTOLIC BLOOD PRESSURE: 92 MMHG | HEART RATE: 76 BPM

## 2019-10-07 LAB
ALBUMIN UR-MCNC: NEGATIVE MG/DL
APPEARANCE UR: CLEAR
BILIRUB UR QL STRIP: NEGATIVE
COLOR UR AUTO: NORMAL
GLUCOSE UR STRIP-MCNC: NEGATIVE MG/DL
HGB UR QL STRIP: NEGATIVE
KETONES UR STRIP-MCNC: NEGATIVE MG/DL
LEUKOCYTE ESTERASE UR QL STRIP: NEGATIVE
NITRATE UR QL: NEGATIVE
PH UR STRIP: 7 PH (ref 5–7)
SOURCE: NORMAL
SP GR UR STRIP: 1 (ref 1–1.03)
UROBILINOGEN UR STRIP-MCNC: NORMAL MG/DL (ref 0–2)

## 2019-10-07 PROCEDURE — 81003 URINALYSIS AUTO W/O SCOPE: CPT | Performed by: PSYCHIATRY & NEUROLOGY

## 2019-10-07 PROCEDURE — H0001 ALCOHOL AND/OR DRUG ASSESS: HCPCS

## 2019-10-07 PROCEDURE — 99232 SBSQ HOSP IP/OBS MODERATE 35: CPT | Performed by: PSYCHIATRY & NEUROLOGY

## 2019-10-07 PROCEDURE — 25000132 ZZH RX MED GY IP 250 OP 250 PS 637: Performed by: PSYCHIATRY & NEUROLOGY

## 2019-10-07 RX ADMIN — BUPRENORPHINE AND NALOXONE 1 FILM: 8; 2 FILM, SOLUBLE BUCCAL; SUBLINGUAL at 08:21

## 2019-10-07 RX ADMIN — HYDROXYZINE HYDROCHLORIDE 25 MG: 25 TABLET, FILM COATED ORAL at 08:34

## 2019-10-07 RX ADMIN — IBUPROFEN 600 MG: 600 TABLET ORAL at 08:34

## 2019-10-07 ASSESSMENT — ACTIVITIES OF DAILY LIVING (ADL)
DRESS: SCRUBS (BEHAVIORAL HEALTH)
ORAL_HYGIENE: INDEPENDENT
HYGIENE/GROOMING: HANDWASHING;INDEPENDENT

## 2019-10-07 NOTE — PROGRESS NOTES
[facility], [place]   Psychiatric Progress Note        Interim History:   The patient's care was discussed with the treatment team during the daily team meeting and/or staff's chart notes were reviewed.  Staff report patient reported having anxi, but denied dep, SI and XAVIER. No overt psychosis, chyna or confusion. Eating and sleeping well. Interested in referral to residential CD treatment and completed the self assessment portion for CD eval.  No overt psychosis, chyna or confusion.     The patient noted that he is feeling better. Reported mild lightheadedness but declined lower or adjust medications. Requested increasing Suboxone but receptive when declined. Withdrawal subsided, no current cravings. No dep, anx or SI. Sleeping and eating well. No hallucinations or racing thoughts. Interested in CD treatment and referral to outpatient Suboxone provider.     Discussed medications and care plan.        Medications:       buprenorphine HCl-naloxone HCl  1 Film Sublingual Daily          Allergies:   No Known Allergies       Labs:   No results found for this or any previous visit (from the past 24 hour(s)).       Psychiatric Examination:     Vitals:    10/06/19 1335 10/06/19 1606 10/06/19 2008 10/07/19 0746   BP: 92/64 95/70 104/68 98/67   BP Location:  Left arm Right arm    Pulse: 78 70 65 75   Resp:  16 16 16   Temp:  97.3  F (36.3  C) 97  F (36.1  C) 98.9  F (37.2  C)   TempSrc:  Tympanic Oral Oral   SpO2:    100%   Weight:       Height:                                                      Weight is 175 lbs 0 oz  Body mass index is 24.41 kg/m .    Appearance: awake, alert, adequately groomed, appeared as age stated and no apparent distress  Attitude:  cooperative  Eye Contact:  good  Mood:  better  Affect:  appropriate and in normal range, full range and reactive  Speech:  clear, coherent and normal prosody  Psychomotor Behavior:  no evidence of tardive dyskinesia, dystonia, or tics and  intact station, gait and muscle tone  Throught Process:  linear and goal oriented  Associations:  no loose associations  Thought Content:  no evidence of suicidal ideation or homicidal ideation and no evidence of psychotic thought  Insight:  fair  Judgement:  intact  Oriented to:  time, person, and place  Attention Span and Concentration:  intact  Recent and Remote Memory:  intact         Precautions:     Behavioral Orders   Procedures     Code 1 - Restrict to Unit     Routine Programming     As clinically indicated     Status 15     Every 15 minutes.     Withdrawal precautions          Diagnoses:     1.  Opiate use disorder, severe.   2.  Opiate withdrawal.          Plan:     Medications:  -- the patient was placed on Opiate withdrawal protocol and subsequently transtion to Suboxone 8-2 mg daily. The patient is interested in opiate maintenance and seeks referral to an outpatient provider.      Legal Status and Disposition:  -- volunt.   -- discharge once completed detox and establish safety in the community. CD assessment is pending. He is interested in referral to residential CD treatment and maintenance program.

## 2019-10-07 NOTE — PROVIDER NOTIFICATION
Pt requesting to discharge despite not having a set discharge plan and risk of relapse.  Notified Dr. Hammond and patient will be discharged AMA.

## 2019-10-07 NOTE — PROGRESS NOTES
Patient discharged AMA to community.  Walked off unit by Sharri CASSIDY.      All patient belongings from room and locked bin were sent with patient. No medications sent with patient and pt had nothing in security. RN reviewed risk of opioid overdose and deaths even after a  short period of no drug use a person's intolerance level is lowered. He was informed that it not safe to think a person can use the same amount of drugs as they did prior to their period of no drug use. Returning to your drug using environment and contact with your drug using friends puts you at high risk for relapse.    Patient verbalizes and demonstrates understanding of all teachings. Patient denies thoughts of harm towards self or others. Pt denies any current medical issues at this time. Patient denies any further questions and has be discharge AMA. He was encouraged to follow up with [facility] for treatment funding and his suboxone maintenance provider.

## 2019-10-07 NOTE — DISCHARGE SUMMARY
Psychiatric Discharge Summary    Wagner Lujan MRN# [id number]   Age: 43 year old YOB: 1975     Date of Admission:  10/4/2019  Date of Discharge:  10/7/2019  Admitting Physician:  Irma Hammond MD  Discharge Physician:  Irma Hammond MD         Event Leading to Hospitalization:     The patient had a previous history of alcoholism, but 4 years ago started experimenting with pain pills and 2 years ago switched from pain pills to heroin.  He has tolerance, withdrawal, progressive use with loss of control, used despite negative consequences, job, money.  He snorts it, does not use it IV.  He is sober from alcohol, but previously had alcohol issues and was in treatment.  He was using meth, started at 21, he smokes it.  He has  tolerance, withdrawal, progressive use with loss of control.  His U-tox was positive for benzos, but denies using it.  He denies any mental health issues.  Denies any suicidal or homicidal ideation, plan or intent.  Denies any auditory or visual hallucinations. Born in [place] and grew up in [place], no abuse, 12 plus 4 years of education.  Maternal grandmother and mother have alcoholism.  No mental health issues.  He was never psychiatrically hospitalized, but he was in treatment at [facility].      See Admission note by Mercedes Ocampo MD on 10/5/2019 for additional details.          Diagnoses:     Opiate use disorder, severe.  Cluster B traits suspected.          Labs:     Recent Results (from the past 168 hour(s))   Drug abuse screen 6 urine (tox)    Collection Time: 10/04/19  3:09 PM   Result Value Ref Range    Amphetamine Qual Urine Positive (A) NEG^Negative    Barbiturates Qual Urine Negative NEG^Negative    Benzodiazepine Qual Urine Positive (A) NEG^Negative    Cannabinoids Qual Urine Negative NEG^Negative    Cocaine Qual Urine Negative NEG^Negative    Ethanol Qual Urine Negative NEG^Negative    Opiates Qualitative Urine Positive (A) NEG^Negative   CBC  with platelets differential    Collection Time: 10/04/19  4:17 PM   Result Value Ref Range    WBC 6.9 4.0 - 11.0 10e9/L    RBC Count 4.47 4.4 - 5.9 10e12/L    Hemoglobin 12.0 (L) 13.3 - 17.7 g/dL    Hematocrit 37.6 (L) 40.0 - 53.0 %    MCV 84 78 - 100 fl    MCH 26.8 26.5 - 33.0 pg    MCHC 31.9 31.5 - 36.5 g/dL    RDW 13.2 10.0 - 15.0 %    Platelet Count 285 150 - 450 10e9/L    Diff Method Automated Method     % Neutrophils 64.8 %    % Lymphocytes 20.0 %    % Monocytes 8.7 %    % Eosinophils 6.1 %    % Basophils 0.1 %    % Immature Granulocytes 0.3 %    Nucleated RBCs 0 0 /100    Absolute Neutrophil 4.5 1.6 - 8.3 10e9/L    Absolute Lymphocytes 1.4 0.8 - 5.3 10e9/L    Absolute Monocytes 0.6 0.0 - 1.3 10e9/L    Absolute Eosinophils 0.4 0.0 - 0.7 10e9/L    Absolute Basophils 0.0 0.0 - 0.2 10e9/L    Abs Immature Granulocytes 0.0 0 - 0.4 10e9/L    Absolute Nucleated RBC 0.0    Basic metabolic panel    Collection Time: 10/04/19  4:17 PM   Result Value Ref Range    Sodium 138 133 - 144 mmol/L    Potassium 3.8 3.4 - 5.3 mmol/L    Chloride 101 94 - 109 mmol/L    Carbon Dioxide 29 20 - 32 mmol/L    Anion Gap 6 3 - 14 mmol/L    Glucose 108 (H) 70 - 99 mg/dL    Urea Nitrogen 11 7 - 30 mg/dL    Creatinine 0.86 0.66 - 1.25 mg/dL    GFR Estimate >90 >60 mL/min/[1.73_m2]    GFR Estimate If Black >90 >60 mL/min/[1.73_m2]    Calcium 8.2 (L) 8.5 - 10.1 mg/dL   TSH with free T4 reflex and/or T3 as indicated    Collection Time: 10/05/19  6:20 AM   Result Value Ref Range    TSH 0.22 (L) 0.40 - 4.00 mU/L   Lipid panel    Collection Time: 10/05/19  6:20 AM   Result Value Ref Range    Cholesterol 163 <200 mg/dL    Triglycerides 23 <150 mg/dL    HDL Cholesterol 88 >39 mg/dL    LDL Cholesterol Calculated 70 <100 mg/dL    Non HDL Cholesterol 75 <130 mg/dL   T4 free    Collection Time: 10/05/19  6:20 AM   Result Value Ref Range    T4 Free 1.01 0.76 - 1.46 ng/dL   UA reflex to Microscopic and Culture    Collection Time: 10/07/19  9:55 AM   Result  Value Ref Range    Color Urine Straw     Appearance Urine Clear     Glucose Urine Negative NEG^Negative mg/dL    Bilirubin Urine Negative NEG^Negative    Ketones Urine Negative NEG^Negative mg/dL    Specific Gravity Urine 1.003 1.003 - 1.035    Blood Urine Negative NEG^Negative    pH Urine 7.0 5.0 - 7.0 pH    Protein Albumin Urine Negative NEG^Negative mg/dL    Urobilinogen mg/dL Normal 0.0 - 2.0 mg/dL    Nitrite Urine Negative NEG^Negative    Leukocyte Esterase Urine Negative NEG^Negative    Source Unspecified Urine             Consults:   Wagner Lujan is a 43 year old male with a hx of polysubstance abuse who was admitted to [facility] station 3A seeking detox from heroin.      # Polysubstance abuse (methamphetamine, heroin, alcohol), acute opiate withdrawal. Management per primary team, psychiatry. Lab workup unremarkable. VSS.   - Agree with subutex     Internal Medicine will sign off at this time. Please notify if any intercurrent medical questions or concerns arise. Thank you for involving me in this patients care.     Smitha Ireland PA-C  Hospitalist Service         Hospital Course:   Wagner Lujan was admitted to detox unit  as a voluntary patient. On admission, he was seen by Mercedes Ocampo MD, who formulated the care plan. The patient was placed under status 15 (15 minute checks) to ensure patient safety. the patient was placed on Opiate withdrawal protocol and subsequently transtion to Suboxone 8-2 mg daily. He exhibited medications seeking behavior. He was not fully cooperative but was referred to CD treatment. He later demanded discharge. AMA discharge was granted. The patient noted that he is feeling better. Reported mild lightheadedness but declined lower or adjust medications. Requested increasing Suboxone but receptive when declined. Withdrawal subsided, no current cravings. No dep, anx or SI. Sleeping and eating well. No hallucinations or racing thoughts    Wagner Lujan was released to home.  At the time of this encounter, Wagner Lujan was determined to not be a danger to himself or others and symptoms did not meet criteria for involuntary hospitalization.      Safety plan, post discharge recommendations and relapse prevention were discussed with the patient. The patient agreed to call 911 or present to ED if symptoms worsen or developed thoughts of suicide, self harm or homicide.  The patient agreed to continue medications and outpatient care.         Discharge Medications:   There are no discharge medications for this patient.       Psychiatric and Physical Examinations:     Appearance: awake, alert, adequately groomed, appeared as age stated and no apparent distress  Attitude:  cooperative  Eye Contact:  good  Mood:  better  Affect:  appropriate and in normal range, full range and reactive  Speech:  clear, coherent and normal prosody  Psychomotor Behavior:  no evidence of tardive dyskinesia, dystonia, or tics and intact station, gait and muscle tone  Throught Process:  linear and goal oriented  Associations:  no loose associations  Thought Content:  no evidence of suicidal ideation or homicidal ideation and no evidence of psychotic thought  Insight:  fair  Judgement:  intact  Oriented to:  time, person, and place  Attention Span and Concentration:  intact  Recent and Remote Memory:  intact     Vitals:    10/06/19 2008 10/07/19 0746 10/07/19 1141 10/07/19 1243   BP: 104/68 98/67  92/64   BP Location: Right arm      Pulse: 65 75  76   Resp: 16 16 16    Temp: 97  F (36.1  C) 98.9  F (37.2  C) 98.8  F (37.1  C)    TempSrc: Oral Oral Oral    SpO2:  100%     Weight:       Height:       Weight is 175 lbs 0 oz  Body mass index is 24.41 kg/m .       Discharge Plan:     Disposition: Home/treatment referral made    Follow up Appointment:  [facility] & mailing address  [address] [place] [id number]  Phone: [phone]      Primary Provider:  Suboxone  Maintenance  [facility]  [address] [place] [address]  [phone]  Appointment: 10/13/19 @ 1pm    DISCHARGE RESOURCES:  -SMART Recovery - self management for addiction recovery:  www.smartrecovery.org    -Pathways ~ A Health Crisis Resource & Support Center: 136.508.6147.  -[facility] [phone]   -[facility] Behavioral Intake 766-069-2513 or 265-367-0726.  -Crisis Intervention: 517.891.9782 or 642-400-7353 (TTY: 568.230.8528).  Call anytime.  -Suicide Awareness Voices of Education (SAVE) (www.save.org): 435-609-BWZI (4446)  -National Suicide Prevention Line (www.mentalhealthmn.org): 859-837-GBBY (1833)  -National [place] on Mental Illness (www.mn.edvin.org): 570.914.7578 or 838-801-1380.  -Iuhc8glkv: text the word LIFE to 19167 for immediate support and crisis intervention  -Mental Health Consumer/Survivor Network of [place] (www.mhcsn.net): 326.255.3786 or 248-610-1353  -Mental Health Association of [place] (www.mentalhealth.org): 938.477.5735 or 557-849-8799     -Substance Abuse and Mental Health Services (www.samhsa.gov)  -Harm Reduction Coalition (www. Harmreduction.org)  -www.prescribetoprevent.org or http://prescribetoprevent.org/video  -Poison control 3-918-665-8214      Sober Support Group Information:  AA/NA & Sponsor/Support  -Alcoholics Anonymous (www.alcoholics-anonymous.org): for local information 24 hours/day  -AA Intergroup service office in [place] (http://www.aastpaul.org/) 384.705.7062  -AA Intergroup service office in [facility]: 247.921.5153. (http://www.a[place].org/)  -Narcotics Anonymous (www.nam[place].org) (584) 719-3600   **Sober Fun Activities: www.soberAudiotoniqactivities.Floqq.[employer]ground [employer]/cities/[place]    Attestation:  The patient has been seen and evaluated by me,  Irma Hammond MD

## 2019-10-07 NOTE — DISCHARGE INSTRUCTIONS
Behavioral Discharge Planning and Instructions  THANK YOU FOR CHOOSING THE [address]  [phone]    Summary: You were admitted to Station 3A on 10/6/19 for detoxification from Opiate. A medical exam was performed that included lab work. You have met with a  and opted to pursue treatment at [facility].  Your funding for treatment has been requested from [facility]. Please take care and make your recovery a priority.    [facility] & mailing address  [address] [place] [address]  Phone: [phone]    Main Diagnosis:  Per /Stephany  Opiate use disorder, severe.   Opiate withdrawal.     Major Treatments, Procedures and Findings:  You received treatment for Opiate withdrawal.  You have met with a  to develop a treatment plan for discharge.  You have had labs drawn and a copy of those labs will be sent home with you.  Please bring your lab results with you to your primary follow up appointment. Make your recovery a priority!    Symptoms to Report:  If you experience more anxiety, confusion, sleeplessness, deep sadness or thoughts of suicide, notify your treatment team or notify your primary care physician. IF ANY OF THE SYMPTOMS YOU ARE EXPERIENCING ARE A MEDICAL EMERGENCY CALL 911 IMMEDIATELY.     Lifestyle Adjustment: Adjust your lifestyle to get enough sleep, relaxation, exercise and  good nutrition. Continue to develop healthy coping skills to decrease stress and promote a sober living environment. Do not use alcohol, illegal drugs or addictive medications other than what is currently prescribed. AA, NA, and  Sponsor are excellent resources for support.     Disposition: Home/treatment referral made      Primary Provider:  Suboxone Maintenance  [facility]  [address] [place] [address]  [phone]    Appointment: 10/13/19 @ 1pm      DISCHARGE  RESOURCES:  -SMART Recovery - self management for addiction recovery:  www.smartrecovery.org    -Pathways ~ A Health Crisis Resource & Support Center: 174.176.2298.  -[facility] [phone]   -[facility] Behavioral Intake 664-642-5234 or 417-220-4268.  -Crisis Intervention: 579.793.7327 or 285-556-4296 (TTY: 616.236.5987).  Call anytime.  -Suicide Awareness Voices of Education (SAVE) (www.save.org): 223-061-WNNK (5681)  -National Suicide Prevention Line (www.mentalhealthmn.org): 654-923-WTHD (5163)  -National [place] on Mental Illness (www.mn.edvin.org): 954.500.2101 or 052-154-1705.  -Armk4rlwk: text the word LIFE to 08728 for immediate support and crisis intervention  -Mental Health Consumer/Survivor Network of [place] (www.mhcsn.net): 425.906.1651 or 113-162-1564  -Mental Health Association of [place] (www.mentalhealth.org): 400.891.3105 or 983-605-0385     -Substance Abuse and Mental Health Services (www.samhsa.gov)  -Harm Reduction Coalition (www. Harmreduction.org)  -www.prescribetoprevent.org or http://prescribetoprevent.org/video  -Poison control 4-591-890-7116     Sober Support Group Information:  AA/NA & Sponsor/Support  -Alcoholics Anonymous (www.alcoholics-anonymous.org): for local information 24 hours/day  -AA Intergroup service office in [place] (http://www.aastpaul.org/) 635.669.6557  -AA Intergroup service office in [facility]: 550.282.5907. (http://www.a[place].org/)  -Narcotics Anonymous (www.nam[place].org) (109) 520-5981   **Sober Fun Activities: www.soberHello! Messengeractivities.JumpIn.Telepathy/cities/[place]      -There has been an increase in opioid overdose and deaths recently.  After even a short period of no drug use a person's intolerance level is lowered.  It is not safe to think a person can use the same amount of drugs as they did prior to their period of no drug use.   Returning to your drug using environment and contact with your drug using friends puts you at  high risk for relapse.    Www.harmreduction.org        General Medication Instructions:   See your medication sheet(s) for instructions.   Take all medicines as directed.  Make no changes unless your doctor suggests them.   Go to all your doctor visits.  Be sure to have all your required lab tests. This way, your medicines can be refilled on time.  Do not use any drugs not prescribed by your provider.  AA/NA and Sponsors are excellent resources for support  Avoid alcohol.    -Please keep your Subuxone in a safe place and out of the reach of children and others.    Please Note:  If you have any questions at anytime after you are discharged please call the [facility] detox unit 3AW unit at [phone].    [facility], Behavioral Intake [phone]    Please take this discharge folder with you to all your follow up appointments, it contains your lab results, diagnosis, medication list and discharge recommendations.      THANK YOU FOR CHOOSING THE [facility]

## 2019-10-07 NOTE — PLAN OF CARE
"  Problem: Substance Withdrawal  Goal: Substance Withdrawal  Description  Signs and symptoms of listed problems will be absent or manageable.  Outcome: No Change  Goal: Social and Therapeutic (Substance Withdrawal)  Description  Signs and symptoms of listed problems will be absent or manageable.  Outcome: No Change    Pt admitted for opiate withdrawal, also using Meth.  COW's 7, given Ibuprofen for leg cramping, vistaril for anxiety and scheduled suboxone 8mg/2mg.  He reports he is very restless, poor sleep despite taking seroquel and trazodone last evening.  He denies depression, reports ongoing situational anxiety, appetite is good and denies any other needs or concerns.  He would like a Rule 25 done and submitted to [facility] and he complete his paperwork and this was given to CM.  Will continue to monitor results from Ibuprofen/vistaril as it was helpful yesterday.  BP 98/67   Pulse 75   Temp 98.9  F (37.2  C) (Oral)   Resp 16   Ht 1.803 m (5' 11\")   Wt 79.4 kg (175 lb)   SpO2 100%   BMI 24.41 kg/m   Pt given a pitcher of water yesterday, which he drank, refilled this am and pt encouraged to push fluids.    P.   Will continue to monitor and assist with discharge planning.               "

## 2019-10-07 NOTE — PROGRESS NOTES
Met with pt and completed assessment.  Pt signed MARIEL and referral was made to [facility].  Pt is aware that there will be a wait for admission and reports he has a safe sober environment where he can wait.  Assessment also faxed to [facility] for funding.

## 2019-10-07 NOTE — PROGRESS NOTES
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 significant other, and living environment are unsupportive, or there is significant criminal justice system involvement.    REASONS SEVERITY WAS ASSIGNED - (What support does the person have for making changes? What structure/stability does the person have in his or her daily life that will increase the likelihood that changes can be sustained? What problems exist in the person s environment that will jeopardize getting/staying clean and sober?)     Pt is unemployed and not in school.  Lacks sober support network.  Unable to arrest use in the community.  Pt has a safe living environment but no accountability.  Pt denies legal problems.         Client Choice/Exceptions   Would you like services specific to language, age, gender, culture, Cheondoism preference, race, ethnicity, sexual orientation or disability?  No    What particular treatment choices and options would you like to have? None    Do you have a preference for a particular treatment program? None    Criteria for Diagnosis     Criteria for Diagnosis  DSM-5 Criteria for Substance Use Disorder  Instructions: Determine whether the client currently meets the criteria for Substance Use Disorder using the diagnostic criteria in the DSM-V pp.481-130. Current means during the most recent 12  months outside a facility that controls access to substances    Category of Substance Severity (ICD-10 Code / DSM 5 Code)     Alcohol Use Disorder The patient does not meet the criteria for an Alcohol use disorder.   Cannabis Use Disorder The patient does not meet the criteria for a Cannabis use disorder.   Hallucinogen Use Disorder The patient does not meet the criteria for a Hallucinogen use disorder.   Inhalant Use Disorder The patient does not meet the criteria for an Inhalant use disorder.   Opioid Use Disorder Severe   (F11.20) (304.00)   Sedative, Hypnotic, or Anxiolytic Use Disorder The patient does not meet the criteria for a Sedative/Hypnotic use disorder.   Stimulant Related Disorder Severe   (F15.20) (304.40) Amphetamine type substance   Tobacco Use Disorder The patient does not meet the criteria for a Tobacco use disorder.   Other (or unknown) Substance Use Disorder The patient does not meet the criteria for a Other (or unknown) Substance use disorder.       Collateral Contact Summary   Number of contacts made: 1    Contact with referring person:  No    If court related records were reviewed, summarize here: No court records had been reviewed at the time of this documentation.    Information from collateral contacts supported/largely agreed with information from the client and associated risk ratings.      Rule 25 Assessment Summary and Plan   's Recommendation    [facility]  Follow the recommendations of your program  Suboxone maintenance  Take medications as prescribed and keep appointments with providers  Support group participation with a male sponsor or peer       Collateral Contacts     Name:    [facility]   Relationship:       Phone Number:     Releases:                 IDENTIFYING INFORMATION:  The patient is a 43-year-old single  male.  He works for [employer].      CHIEF COMPLAINT:  Heroin.      HISTORY OF PRESENT ILLNESS:  The  patient had a previous history of alcoholism, but 4 years ago started experimenting with pain pills and 2 years ago switched from pain pills to heroin.  He has tolerance, withdrawal, progressive use with loss of control, used despite negative consequences, job, money.  He snorts it, does not use it IV.  He is sober from alcohol, but previously had alcohol issues and was in treatment.  He was using meth, started at 21, he smokes it.  He has  tolerance, withdrawal, progressive use with loss of control.  His U-tox was positive for benzos, but denies using it.  He denies any mental health issues.  Denies any suicidal or homicidal ideation, plan or intent.  Denies any auditory or visual hallucinations.      PAST PSYCHIATRIC HISTORY:  He was never psychiatrically hospitalized, but he was in treatment at [facility].      REVIEW OF SYSTEMS:  A 10-point review systems was reviewed and is significant for having opiate withdrawal symptoms.  The patient is having a runny nose, chills, body aches.      VITAL SIGNS:  Temperature of 98, pulse of 83, respiratory rate of 16, blood pressure 128/78.      FAMILY HISTORY:  Maternal grandmother and mother have alcoholism.  No mental health issues.      SOCIAL HISTORY:  Born in [place] and grew up in [place], no abuse, 13 plus 4 years of education.      MENTAL STATUS EXAMINATION:  The patient is a 43-year-old  male who appears his stated age.  He has adequate grooming, adequate hygiene, maintains good eye contact, cooperative, no psychomotor abnormalities.  Normal gait.  Mood is anxious.  Affect is congruent.  Speech is spontaneous, normal rate.  Linear thought process with no loose associations.  Does not have any active suicidal or homicidal ideation.  Does not have auditory hallucinations.  Judgment and insight are fair.  Alert, oriented x3.  Recent memory, language are all adequate.        DIAGNOSES:    1.  Opiate use disorder, severe.   2.  Opiate withdrawal.       PLAN:  The patient wants to be on Suboxone maintenance.  The patient wants inpatient treatment.  The patient's U-tox was positive for benzos, but he denies using any benzos.  He will be having symptomatic detox from methamphetamine.  The patient will be followed by Dr. Hammond who resumes care on Monday.         ASH OCAMPO MD              Last signed by: Ash Ocampo MD at 10/5/2019 12:15 PM         Collateral Contacts     Name:       Relationship:       Phone Number:       Releases:             jordi Contacts      A problematic pattern of alcohol/drug use leading to clinically significant impairment or distress, as manifested by at least two of the following, occurring within a 12-month period:    1.) Alcohol/drug is often taken in larger amounts or over a longer period than was intended.  2.) There is a persistent desire or unsuccessful efforts to cut down or control alcohol/drug use  3.) A great deal of time is spent in activities necessary to obtain alcohol, use alcohol, or recover from its effects.  4.) Craving, or a strong desire or urge to use alcohol/drug  5.) Recurrent alcohol/drug use resulting in a failure to fulfill major role obligations at work, school or home.  6.) Continued alcohol use despite having persistent or recurrent social or interpersonal problems caused or exacerbated by the effects of alcohol/drug.  7.) Important social, occupational, or recreational activities are given up or reduced because of alcohol/drug use.  8.) Recurrent alcohol/drug use in situations in which it is physically hazardous.  9.) Alcohol/drug use is continued despite knowledge of having a persistent or recurrent physical or psychological problem that is likely to have been caused or exacerbated by alcohol.  10.) Tolerance, as defined by either of the following: A need for markedly increased amounts of alcohol/drug to achieve intoxication or desired effect. and A markedly diminished effect with  continued use of the same amount of alcohol/drug.  11.) Withdrawal, as manifested by either of the following: The characteristic withdrawal syndrome for alcohol/drug (refer to Criteria A and B of the criteria set for alcohol/drug withdrawal). and Alcohol/drug (or a closely related substance, such as a benzodiazepine) is taken to relieve or avoid withdrawal symptoms.      Specify if: In early remission:  After full criteria for alcohol/drug use disorder were previously met, none of the criteria for alcohol/drug use disorder have been met for at least 3 months but for less than 12 months (with the exception that Criterion A4,  Craving or a strong desire or urge to use alcohol/drug  may be met).     In sustained remission:   After full criteria for alcohol use disorder were previously met, non of the criteria for alcohol/drug use disorder have been met at any time during a period of 12 months or longer (with the exception that Criterion A4,  Craving or strong desire or urge to use alcohol/drug  may be met).   Specify if:   This additional specifier is used if the individual is in an environment where access to alcohol is restricted.    Mild: Presence of 2-3 symptoms  Moderate: Presence of 4-5 symptoms  Severe: Presence of 6 or more symptoms

## 2019-10-21 NOTE — PLAN OF CARE
Behavioral Team Discussion: (10/7/2019)    Continued Stay Criteria/Rationale: Patient admitted for opiate withdrawal complex and opiste Use Disorder.  Plan: The following services will be provided to the patient; psychiatric assessment, medication management, therapeutic milieu, individual and group support, and skills groups.   Participants: 3A Provider: Dr. Irma Hammond MD; 3A RN's: Tahmina Dawson RN; 3A CM's: Sabrina Ramirez .  Summary/Recommendation: Providers will assess today for treatment recommendations, discharge planning, and aftercare plans. CM will meet with pt for discharge planning.   Medical/Physical: none noted  Precautions:   Behavioral Orders   Procedures     Code 1 - Restrict to Unit     Routine Programming     As clinically indicated     Status 15     Every 15 minutes.     Withdrawal precautions     Rationale for change in precautions or plan: N/A  Progress: No Change.     WDL

## 2021-05-25 ENCOUNTER — RECORDS - HEALTHEAST (OUTPATIENT)
Dept: ADMINISTRATIVE | Facility: CLINIC | Age: 46
End: 2021-05-25

## 2021-05-30 ENCOUNTER — RECORDS - HEALTHEAST (OUTPATIENT)
Dept: ADMINISTRATIVE | Facility: CLINIC | Age: 46
End: 2021-05-30

## 2021-06-01 ENCOUNTER — RECORDS - HEALTHEAST (OUTPATIENT)
Dept: ADMINISTRATIVE | Facility: CLINIC | Age: 46
End: 2021-06-01

## 2021-06-03 ENCOUNTER — RECORDS - HEALTHEAST (OUTPATIENT)
Dept: ADMINISTRATIVE | Facility: CLINIC | Age: 46
End: 2021-06-03